# Patient Record
(demographics unavailable — no encounter records)

---

## 2025-07-10 NOTE — CARDIOLOGY SUMMARY
[de-identified] : 07/09/25 - normal sinus rhythm, left axis, nonspecific T-wave abnormality [de-identified] : 06/23/25 - MVP (posterior mitral leaflet), mild MR, normal LA, mild segmental LV systolic dysfunction, normal RV size and function, LVEF 40-45% [de-identified] : 06/24/25 (PCI) - XIENCE SKYPOINT to mRCA 80% 06/23/25 (PCI) - MIKE FRONTIER stent to OM2 100% 06/23/25 (CATH) - OM2 100%, pRCA severe stenosis, LVEDP 31 mmHg

## 2025-07-10 NOTE — HISTORY OF PRESENT ILLNESS
[FreeTextEntry1] : Admitted to [facility] from 06/23/25-06/25/25 with chest discomfort and was diagnosed with STEMI. Since discharge, he has been doing well. Denies chest pain, shortness of breath or palpitations.

## 2025-07-10 NOTE — CARDIOLOGY SUMMARY
[de-identified] : 07/09/25 - normal sinus rhythm, left axis, nonspecific T-wave abnormality [de-identified] : 06/23/25 - MVP (posterior mitral leaflet), mild MR, normal LA, mild segmental LV systolic dysfunction, normal RV size and function, LVEF 40-45% [de-identified] : 06/24/25 (PCI) - XIENCE SKYPOINT to mRCA 80% 06/23/25 (PCI) - MIKE FRONTIER stent to OM2 100% 06/23/25 (CATH) - OM2 100%, pRCA severe stenosis, LVEDP 31 mmHg

## 2025-07-10 NOTE — HISTORY OF PRESENT ILLNESS
[FreeTextEntry1] : Admitted to [facility] from 06/23/25-06/25/25 with chest discomfort and was diagnosed with STEMI. Since discharge, he has been doing well. Denies chest pain, shortness of breath or palpitations.

## 2025-07-10 NOTE — DISCUSSION/SUMMARY
[Systolic Heart Failure] : systolic heart failure [Coronary Artery Disease] : coronary artery disease [Stable] : stable [FreeTextEntry1] : Currently stable from a cardiovascular standpoint. Normotensive. Euvolemic. Stable CAD (s/p STEMI->OM2 PCI, staged mid RCA PCI) with mild-moderate LV systolic dysfunction (LVEF 40-45%). No ischemic or CHF symptoms. Continue current medications including aspirin and ticagrelor. ECG completed today and reviewed (findings as noted above). Recent cath and echo results reviewed. Follow up in 4-6 weeks. [EKG obtained to assist in diagnosis and management of assessed problem(s)] : EKG obtained to assist in diagnosis and management of assessed problem(s)

## 2025-07-16 NOTE — HEALTH RISK ASSESSMENT
[No] : In the past 12 months have you used drugs other than those required for medical reasons? No [No falls in past year] : Patient reported no falls in the past year [Little interest or pleasure doing things] : 1) Little interest or pleasure doing things [Feeling down, depressed, or hopeless] : 2) Feeling down, depressed, or hopeless [0] : 2) Feeling down, depressed, or hopeless: Not at all (0) [PHQ-2 Negative - No further assessment needed] : PHQ-2 Negative - No further assessment needed [Never] : Never [de-identified] : no [de-identified] : cardiologist 7/11/2025 [de-identified] : walking [de-identified] : good  [[id number]] : 0

## 2025-07-16 NOTE — PLAN
[FreeTextEntry1] : Total time spent caring for the patient today was 45 minutes. This includes time spent before the visit reviewing the chart, time spent during the visit, and time spent after the visit on documentation, etc.

## 2025-07-16 NOTE — HISTORY OF PRESENT ILLNESS
[FreeTextEntry8] : 48-year-old male with obesity, recent STEMI status post drug-eluting stent is presenting today for establishment of care visit.  He has seen cardiology and is currently taking several medications including aspirin, atorvastatin, carvedilol, losartan, ticagrelor.  He denies any chest pain or shortness of breath.  He would like to lose weight and is interested in seeing a nutritionist.  He is also feeling overwhelmed after he had a heart attack and would like to speak with therapist.  He reports a family history of heart disease and states that his father had a heart attack at age 38.

## 2025-07-16 NOTE — HEALTH RISK ASSESSMENT
[No] : In the past 12 months have you used drugs other than those required for medical reasons? No [No falls in past year] : Patient reported no falls in the past year [Little interest or pleasure doing things] : 1) Little interest or pleasure doing things [Feeling down, depressed, or hopeless] : 2) Feeling down, depressed, or hopeless [0] : 2) Feeling down, depressed, or hopeless: Not at all (0) [PHQ-2 Negative - No further assessment needed] : PHQ-2 Negative - No further assessment needed [Never] : Never [de-identified] : no [de-identified] : cardiologist 7/11/2025 [de-identified] : walking [de-identified] : good  [[id number]] : 0